# Patient Record
Sex: MALE | Race: WHITE | NOT HISPANIC OR LATINO | Employment: OTHER | URBAN - METROPOLITAN AREA
[De-identification: names, ages, dates, MRNs, and addresses within clinical notes are randomized per-mention and may not be internally consistent; named-entity substitution may affect disease eponyms.]

---

## 2019-04-10 ENCOUNTER — APPOINTMENT (EMERGENCY)
Dept: RADIOLOGY | Facility: HOSPITAL | Age: 69
End: 2019-04-10
Payer: MEDICARE

## 2019-04-10 ENCOUNTER — HOSPITAL ENCOUNTER (EMERGENCY)
Facility: HOSPITAL | Age: 69
Discharge: HOME | End: 2019-04-10
Attending: EMERGENCY MEDICINE | Admitting: EMERGENCY MEDICINE
Payer: MEDICARE

## 2019-04-10 VITALS
HEIGHT: 71 IN | WEIGHT: 185 LBS | BODY MASS INDEX: 25.9 KG/M2 | RESPIRATION RATE: 16 BRPM | SYSTOLIC BLOOD PRESSURE: 141 MMHG | TEMPERATURE: 97.9 F | DIASTOLIC BLOOD PRESSURE: 67 MMHG | OXYGEN SATURATION: 96 % | HEART RATE: 51 BPM

## 2019-04-10 DIAGNOSIS — M54.9 BACK PAIN: Primary | ICD-10-CM

## 2019-04-10 LAB
ALBUMIN SERPL BCP-MCNC: 4.3 G/DL (ref 3.5–5.7)
ALP SERPL-CCNC: 71 U/L (ref 55–165)
ALT SERPL W P-5'-P-CCNC: 22 U/L (ref 7–52)
ANION GAP SERPL CALCULATED.3IONS-SCNC: 5 MMOL/L (ref 4–13)
AST SERPL W P-5'-P-CCNC: 19 U/L (ref 13–39)
BACTERIA UR QL AUTO: ABNORMAL /HPF
BASOPHILS # BLD AUTO: 0.1 THOUSANDS/ΜL (ref 0–0.1)
BASOPHILS NFR BLD AUTO: 1 % (ref 0–2)
BILIRUB SERPL-MCNC: 0.3 MG/DL (ref 0.2–1)
BILIRUB UR QL STRIP: NEGATIVE
BUN SERPL-MCNC: 25 MG/DL (ref 7–25)
CALCIUM SERPL-MCNC: 9 MG/DL (ref 8.6–10.5)
CHLORIDE SERPL-SCNC: 107 MMOL/L (ref 98–107)
CLARITY UR: CLEAR
CO2 SERPL-SCNC: 26 MMOL/L (ref 21–31)
COLOR UR: YELLOW
CREAT SERPL-MCNC: 1.17 MG/DL (ref 0.7–1.3)
EOSINOPHIL # BLD AUTO: 0.3 THOUSAND/ΜL (ref 0–0.61)
EOSINOPHIL NFR BLD AUTO: 3 % (ref 0–5)
ERYTHROCYTE [DISTWIDTH] IN BLOOD BY AUTOMATED COUNT: 14 % (ref 11.5–14.5)
GFR SERPL CREATININE-BSD FRML MDRD: 63 ML/MIN/1.73SQ M
GLUCOSE SERPL-MCNC: 117 MG/DL (ref 65–99)
GLUCOSE UR STRIP-MCNC: NEGATIVE MG/DL
HCT VFR BLD AUTO: 36.6 % (ref 42–47)
HGB BLD-MCNC: 12.9 G/DL (ref 14–18)
HGB UR QL STRIP.AUTO: ABNORMAL
KETONES UR STRIP-MCNC: NEGATIVE MG/DL
LEUKOCYTE ESTERASE UR QL STRIP: NEGATIVE
LYMPHOCYTES # BLD AUTO: 2.6 THOUSANDS/ΜL (ref 0.6–4.47)
LYMPHOCYTES NFR BLD AUTO: 35 % (ref 21–51)
MCH RBC QN AUTO: 33.1 PG (ref 26–34)
MCHC RBC AUTO-ENTMCNC: 35.4 G/DL (ref 31–37)
MCV RBC AUTO: 94 FL (ref 81–99)
MONOCYTES # BLD AUTO: 0.9 THOUSAND/ΜL (ref 0.17–1.22)
MONOCYTES NFR BLD AUTO: 12 % (ref 2–12)
NEUTROPHILS # BLD AUTO: 3.8 THOUSANDS/ΜL (ref 1.4–6.5)
NEUTS SEG NFR BLD AUTO: 49 % (ref 42–75)
NITRITE UR QL STRIP: NEGATIVE
NON-SQ EPI CELLS URNS QL MICRO: ABNORMAL /HPF
PH UR STRIP.AUTO: 6 [PH]
PLATELET # BLD AUTO: 235 THOUSANDS/UL (ref 149–390)
PMV BLD AUTO: 7.4 FL (ref 8.6–11.7)
POTASSIUM SERPL-SCNC: 4.1 MMOL/L (ref 3.5–5.5)
PROT SERPL-MCNC: 6.9 G/DL (ref 6.4–8.9)
PROT UR STRIP-MCNC: NEGATIVE MG/DL
RBC # BLD AUTO: 3.91 MILLION/UL (ref 4.3–5.9)
RBC #/AREA URNS AUTO: ABNORMAL /HPF
SODIUM SERPL-SCNC: 138 MMOL/L (ref 134–143)
SP GR UR STRIP.AUTO: 1.02 (ref 1–1.03)
TROPONIN I SERPL-MCNC: <0.03 NG/ML
UROBILINOGEN UR QL STRIP.AUTO: 0.2 E.U./DL
WBC # BLD AUTO: 7.6 THOUSAND/UL (ref 4.8–10.8)
WBC #/AREA URNS AUTO: ABNORMAL /HPF

## 2019-04-10 PROCEDURE — 85025 COMPLETE CBC W/AUTO DIFF WBC: CPT | Performed by: EMERGENCY MEDICINE

## 2019-04-10 PROCEDURE — 84484 ASSAY OF TROPONIN QUANT: CPT | Performed by: EMERGENCY MEDICINE

## 2019-04-10 PROCEDURE — 80053 COMPREHEN METABOLIC PANEL: CPT | Performed by: EMERGENCY MEDICINE

## 2019-04-10 PROCEDURE — 99284 EMERGENCY DEPT VISIT MOD MDM: CPT

## 2019-04-10 PROCEDURE — 71046 X-RAY EXAM CHEST 2 VIEWS: CPT

## 2019-04-10 PROCEDURE — 93005 ELECTROCARDIOGRAM TRACING: CPT

## 2019-04-10 PROCEDURE — 74018 RADEX ABDOMEN 1 VIEW: CPT

## 2019-04-10 PROCEDURE — 36415 COLL VENOUS BLD VENIPUNCTURE: CPT | Performed by: EMERGENCY MEDICINE

## 2019-04-10 PROCEDURE — 81001 URINALYSIS AUTO W/SCOPE: CPT | Performed by: EMERGENCY MEDICINE

## 2019-04-10 RX ORDER — CLONIDINE HYDROCHLORIDE 0.1 MG/1
0.2 TABLET ORAL ONCE
Status: COMPLETED | OUTPATIENT
Start: 2019-04-10 | End: 2019-04-10

## 2019-04-10 RX ORDER — ASPIRIN 81 MG/1
81 TABLET ORAL DAILY
COMMUNITY

## 2019-04-10 RX ADMIN — CLONIDINE HYDROCHLORIDE 0.2 MG: 0.1 TABLET ORAL at 20:20

## 2019-04-11 LAB
ATRIAL RATE: 57 BPM
P AXIS: 67 DEGREES
PR INTERVAL: 188 MS
QRS AXIS: 15 DEGREES
QRSD INTERVAL: 104 MS
QT INTERVAL: 436 MS
QTC INTERVAL: 424 MS
T WAVE AXIS: 37 DEGREES
VENTRICULAR RATE: 57 BPM

## 2019-04-11 PROCEDURE — 93010 ELECTROCARDIOGRAM REPORT: CPT | Performed by: INTERNAL MEDICINE

## 2021-12-09 ENCOUNTER — TELEPHONE (OUTPATIENT)
Dept: SURGERY | Facility: CLINIC | Age: 71
End: 2021-12-09

## 2021-12-16 ENCOUNTER — TELEPHONE (OUTPATIENT)
Dept: SURGERY | Facility: CLINIC | Age: 71
End: 2021-12-16

## 2022-01-27 ENCOUNTER — APPOINTMENT (OUTPATIENT)
Dept: LAB | Facility: CLINIC | Age: 72
End: 2022-01-27
Payer: COMMERCIAL

## 2022-01-27 DIAGNOSIS — Z01.818 OTHER SPECIFIED PRE-OPERATIVE EXAMINATION: ICD-10-CM

## 2022-01-27 LAB
ALBUMIN SERPL BCP-MCNC: 3.9 G/DL (ref 3.5–5)
ALP SERPL-CCNC: 103 U/L (ref 46–116)
ALT SERPL W P-5'-P-CCNC: 33 U/L (ref 12–78)
ANION GAP SERPL CALCULATED.3IONS-SCNC: 2 MMOL/L (ref 4–13)
AST SERPL W P-5'-P-CCNC: 16 U/L (ref 5–45)
BASOPHILS # BLD AUTO: 0.06 THOUSANDS/ΜL (ref 0–0.1)
BASOPHILS NFR BLD AUTO: 1 % (ref 0–1)
BILIRUB SERPL-MCNC: 0.36 MG/DL (ref 0.2–1)
BUN SERPL-MCNC: 29 MG/DL (ref 5–25)
CALCIUM SERPL-MCNC: 9.7 MG/DL (ref 8.3–10.1)
CHLORIDE SERPL-SCNC: 107 MMOL/L (ref 100–108)
CO2 SERPL-SCNC: 28 MMOL/L (ref 21–32)
CREAT SERPL-MCNC: 1.18 MG/DL (ref 0.6–1.3)
EOSINOPHIL # BLD AUTO: 0.16 THOUSAND/ΜL (ref 0–0.61)
EOSINOPHIL NFR BLD AUTO: 2 % (ref 0–6)
ERYTHROCYTE [DISTWIDTH] IN BLOOD BY AUTOMATED COUNT: 13.8 % (ref 11.6–15.1)
GFR SERPL CREATININE-BSD FRML MDRD: 61 ML/MIN/1.73SQ M
GLUCOSE P FAST SERPL-MCNC: 146 MG/DL (ref 65–99)
HCT VFR BLD AUTO: 38.4 % (ref 36.5–49.3)
HGB BLD-MCNC: 12.9 G/DL (ref 12–17)
IMM GRANULOCYTES # BLD AUTO: 0.02 THOUSAND/UL (ref 0–0.2)
IMM GRANULOCYTES NFR BLD AUTO: 0 % (ref 0–2)
LYMPHOCYTES # BLD AUTO: 2.21 THOUSANDS/ΜL (ref 0.6–4.47)
LYMPHOCYTES NFR BLD AUTO: 31 % (ref 14–44)
MCH RBC QN AUTO: 32 PG (ref 26.8–34.3)
MCHC RBC AUTO-ENTMCNC: 33.6 G/DL (ref 31.4–37.4)
MCV RBC AUTO: 95 FL (ref 82–98)
MONOCYTES # BLD AUTO: 0.72 THOUSAND/ΜL (ref 0.17–1.22)
MONOCYTES NFR BLD AUTO: 10 % (ref 4–12)
NEUTROPHILS # BLD AUTO: 3.96 THOUSANDS/ΜL (ref 1.85–7.62)
NEUTS SEG NFR BLD AUTO: 56 % (ref 43–75)
NRBC BLD AUTO-RTO: 0 /100 WBCS
PLATELET # BLD AUTO: 232 THOUSANDS/UL (ref 149–390)
PMV BLD AUTO: 10.3 FL (ref 8.9–12.7)
POTASSIUM SERPL-SCNC: 4.1 MMOL/L (ref 3.5–5.3)
PROT SERPL-MCNC: 8 G/DL (ref 6.4–8.2)
RBC # BLD AUTO: 4.03 MILLION/UL (ref 3.88–5.62)
SODIUM SERPL-SCNC: 137 MMOL/L (ref 136–145)
WBC # BLD AUTO: 7.13 THOUSAND/UL (ref 4.31–10.16)

## 2022-01-27 PROCEDURE — 80053 COMPREHEN METABOLIC PANEL: CPT

## 2022-01-27 PROCEDURE — 36415 COLL VENOUS BLD VENIPUNCTURE: CPT

## 2022-01-27 PROCEDURE — 85025 COMPLETE CBC W/AUTO DIFF WBC: CPT

## 2022-02-07 ENCOUNTER — OFFICE VISIT (OUTPATIENT)
Dept: FAMILY MEDICINE CLINIC | Facility: CLINIC | Age: 72
End: 2022-02-07
Payer: COMMERCIAL

## 2022-02-07 VITALS
HEART RATE: 71 BPM | DIASTOLIC BLOOD PRESSURE: 70 MMHG | HEIGHT: 71 IN | BODY MASS INDEX: 24.67 KG/M2 | WEIGHT: 176.2 LBS | OXYGEN SATURATION: 92 % | SYSTOLIC BLOOD PRESSURE: 148 MMHG | TEMPERATURE: 96 F

## 2022-02-07 DIAGNOSIS — Z76.89 ENCOUNTER TO ESTABLISH CARE WITH NEW DOCTOR: Primary | ICD-10-CM

## 2022-02-07 DIAGNOSIS — F17.200 CURRENT EVERY DAY SMOKER: ICD-10-CM

## 2022-02-07 DIAGNOSIS — I70.0 ABDOMINAL AORTIC ATHEROSCLEROSIS (HCC): ICD-10-CM

## 2022-02-07 DIAGNOSIS — J43.9 PULMONARY EMPHYSEMA, UNSPECIFIED EMPHYSEMA TYPE (HCC): ICD-10-CM

## 2022-02-07 DIAGNOSIS — R73.01 ELEVATED FASTING GLUCOSE: ICD-10-CM

## 2022-02-07 PROBLEM — Z87.820 HISTORY OF BRAIN CONCUSSION: Status: ACTIVE | Noted: 2022-02-07

## 2022-02-07 PROBLEM — D35.02 ADENOMA OF LEFT ADRENAL GLAND: Status: ACTIVE | Noted: 2022-02-07

## 2022-02-07 PROCEDURE — 1160F RVW MEDS BY RX/DR IN RCRD: CPT | Performed by: FAMILY MEDICINE

## 2022-02-07 PROCEDURE — 99203 OFFICE O/P NEW LOW 30 MIN: CPT | Performed by: FAMILY MEDICINE

## 2022-02-07 PROCEDURE — 3725F SCREEN DEPRESSION PERFORMED: CPT | Performed by: FAMILY MEDICINE

## 2022-02-07 PROCEDURE — 3288F FALL RISK ASSESSMENT DOCD: CPT | Performed by: FAMILY MEDICINE

## 2022-02-07 PROCEDURE — 1101F PT FALLS ASSESS-DOCD LE1/YR: CPT | Performed by: FAMILY MEDICINE

## 2022-02-07 RX ORDER — MIRTAZAPINE 15 MG/1
15 TABLET, FILM COATED ORAL DAILY
COMMUNITY

## 2022-02-07 NOTE — PROGRESS NOTES
PRE-OPERATIVE EVALUATION  FAMILY PRACTICE AT [id number]    NAME: Paul Corrales  AGE: 66 y o  SEX: male  : 1950     DATE: 2022    Internal Medicine Pre-Operative Evaluation      Chief Complaint: Pre-operative Evaluation     Surgery: hernia repair  Anticipated Date of Surgery: 02/10/2022  Referring Provider: Dr Francisco Gutierrez      History of Present Illness:     Paul Corrales is a 66 y o  male who presents to the office today for visit to establish care and stating that he needs a preoperative consultation  Planned anesthesia is general  Patient has a bleeding risk of: no recent abnormal bleeding, no remote history of abnormal bleeding and no use of Ca-channel blockers  Patient does not have objections to receiving blood products if needed  Current anti-platelet/anti-coagulation medications that the patient is prescribed includes: Aspirin        Assessment of Chronic Conditions:         Pulmonary emphysema, unspecified emphysema type (HCC)      asymptomatic, stable      Current every day smoker      we discussed smoking cessation      Abdominal aortic atherosclerosis (HCC)      clinically stable- decrease and control risk factors      Elevated fasting glucose      prediabetes- pt will adjust diet, cut back on sugars/carbohydrates        Assessment of Cardiac Risk:  · Denies unstable or severe angina or MI in the last 6 weeks or history of stent placement in the last year   · Denies decompensated heart failure (e g  New onset heart failure, NYHA functional class IV heart failure, or worsening existing heart failure)  · Denies significant arrhythmias such as high grade AV block, symptomatic ventricular arrhythmia, newly recognized ventricular tachycardia, supraventricular tachycardia with resting heart rate >100, or symptomatic bradycardia  · Denies severe heart valve disease including aortic stenosis or symptomatic mitral stenosis     Exercise Capacity:  · Able to walk 4 blocks without symptoms?: yes  · Able to walk 2 flights without symptoms?: yes    Prior Anesthesia Reactions: No     Personal history of venous thromboembolic disease? No    History of steroid use for >2 weeks within last year? No    STOP-BANG Sleep Apnea Screening Questionnaire:  no witnessed apnea events       Review of Systems:     Review of Systems    Current Problem List:     Patient Active Problem List   Diagnosis    Elevated fasting glucose    Pulmonary emphysema (HCC)    Current every day smoker    History of brain concussion    Abdominal aortic atherosclerosis (HCC)    Adenoma of left adrenal gland       Allergies:     No Known Allergies    Physical Exam:     Vitals:    02/07/22 1202   BP: 148/70   BP Location: Left arm   Patient Position: Sitting   Cuff Size: Standard   Pulse: 71   Temp: (!) 96 °F (35 6 °C)   TempSrc: Tympanic   SpO2: 92%   Weight: 79 9 kg (176 lb 3 2 oz)   Height: 5' 11" (1 803 m)           Current Outpatient Medications:     albuterol (PROVENTIL HFA,VENTOLIN HFA) 90 mcg/act inhaler, Inhale 2 puffs every 6 (six) hours as needed, Disp: , Rfl:     aspirin (ECOTRIN LOW STRENGTH) 81 mg EC tablet, Take 81 mg by mouth daily, Disp: , Rfl:     atorvastatin (LIPITOR) 40 mg tablet, Take 40 mg by mouth daily, Disp: , Rfl:     esomeprazole (NexIUM) 20 mg capsule, Take 20 mg by mouth in the morning, Disp: , Rfl:     lisinopril (ZESTRIL) 20 mg tablet, Take 20 mg by mouth daily, Disp: , Rfl:     LORazepam (ATIVAN) 1 mg tablet, Take by mouth, Disp: , Rfl:     melatonin 3 mg, Take 6 mg by mouth, Disp: , Rfl:     mirtazapine (REMERON) 15 mg tablet, Take 15 mg by mouth in the morning, Disp: , Rfl:     sertraline (ZOLOFT) 100 mg tablet, Take 150 mg by mouth daily, Disp: , Rfl:     Past Medical History:       Past Medical History:   Diagnosis Date    [facility]m [facility] )     [facility]     History of stomach ulcers     Hypertension     Leaky heart valve     Renal disorder         History reviewed   No pertinent surgical history  History reviewed  No pertinent family history  Social History     Socioeconomic History    Marital status: /Civil Union     Spouse name: Not on file    Number of children: Not on file    Years of education: Not on file    Highest education level: Not on file   Occupational History    Not on file   Tobacco Use    Smoking status: Current Every Day Smoker     Packs/day: 0 25    Smokeless tobacco: Never Used   Vaping Use    Vaping Use: Never used   Substance and Sexual Activity    Alcohol use: Never    Drug use: Never    Sexual activity: Not on file   Other Topics Concern    Not on file   Social History Narrative    [employer] [address]         Social Determinants of Health     Financial Resource Strain: Not on file   Food Insecurity: Not on file   Transportation Needs: Not on file   Physical Activity: Not on file   Stress: Not on file   Social Connections: Not on file   Intimate Partner Violence: Not on file   Housing Stability: Not on file        Physical Exam:     Physical Exam     Data:     Pre-operative work-up    Laboratory Results: I have personally reviewed the pertinent laboratory results/reports     EKG: I have personally reviewed pertinent reports  and saw cardiol 01/18/2022    Chest x-ray: 2019 normal    Previous cardiopulmonary studies within the past year:  · Echocardiogram: none  · Cardiac Catheterization: none  · Stress Test: none  · Pulmonary Function Testing: none      Assessment & Recommendations:     1  Encounter to establish care with new doctor     2  Pulmonary emphysema, unspecified emphysema type ([address] )      asymptomatic, stable   3  Current every day smoker      we discussed smoking cessation   4  Abdominal aortic atherosclerosis (HCC)      clinically stable- decrease and control risk factors   5   Elevated fasting glucose      prediabetes- pt will adjust diet, cut back on sugars/carbohydrates       Pre-Op Evaluation Assessment  66 y o  male with planned surgery: hernia repair  Known risk factors for perioperative complications: Chronic pulmonary disease  Cardiac Risk Estimation: per the Revised Cardiac Risk Index (Circ  100:1043, 1999), the patient's risk factors for cardiac complications include none, putting him in: RCI RISK CLASS I (0 risk factors, risk of major cardiac compl  appr  0 5%)  Current medications which may produce withdrawal symptoms if withheld perioperatively: zoloft  Pre-Op Evaluation Plan  1  Further preoperative workup as follows:   - None; no further preoperative work-up is required    2  Medication Management/Recommendations:   - Patient has been instructed to avoid aspirin containing medications or non-steroidal anti-inflammatory drugs for the week preceding surgery  3  Prophylaxis for cardiac events with perioperative beta-blockers: should be considered, specific regimen per anesthesia  4  Patient requires further consultation with: None    Clearance  Patient is CLEARED for surgery without any additional cardiac testing       Kaleb Varma DO  FAMILY PRACTICE AT [facility]  [address] [facility] [address]  Phone#  [phone]  Fax#  [phone]

## 2022-02-07 NOTE — PROGRESS NOTES
Assessment/Plan:         Diagnoses and all orders for this visit:    Encounter to establish care with new doctor    Pulmonary emphysema, unspecified emphysema type ([facility] )  Comments:  asymptomatic, stable    Current every day smoker  Comments:  we discussed smoking cessation    Abdominal aortic atherosclerosis ([facility] )  Comments:  clinically stable- decrease and control risk factors    Elevated fasting glucose  Comments:  prediabetes- pt will adjust diet, cut back on sugars/carbohydrates    Other orders  -     esomeprazole (NexIUM) 20 mg capsule; Take 20 mg by mouth in the morning  -     mirtazapine (REMERON) 15 mg tablet; Take 15 mg by mouth in the morning          Subjective:   Chief Complaint   Patient presents with   [address]     Does not want flu vaccine,         Patient ID: Polina Bryan is a 66 y o  male  Here as new pt- previous PCP was Dr Adry Carranza for more than 20 yrs per pt- doesn't take his new insurance- he reports he is scheduled for hernia repair in 3 days and needs preop clearance  Chart review by me shows that he saw [facility] cardiol 01/19/2022 as new pt for cardiac clearance and was cleared  Psych meds on med list - pt states rx'd by [facility]          1/19/2022  [facility]  Progress Notes  Luis E Cool MD - 01/19/2022 9:45 AM EST  Formatting of this note is different from the original   Polina Bryan is a 66 y o  male who was seen by [address] [place] [address] on 1/19/2022 for evaluation of preop risk  past medical [facility] of [facility] ), H pylori ulcer, Hypertension, and PTSD (post-traumatic stress disorder)  Problem List Items Addressed LAST Visit   Lab work at [address] [facility] last month  ? Low hgb  +HTN of lisinopril  Forgot meds and rushed, BP high  No hx DM, MI, CAD/ACS, CVA  Cardiac review of systems is negative for symptoms suggestive of angina, congestive heart failure, arrhythmia, TIA or stroke and claudication  No edema  Good functional capacity   Able to walk an unlimited distance  No restriction on normal activity  Some pain with hernia  Smokes less than 1 ppd  Below are reports reviewed this visit:   Date: 2/10/2022 Time:  2:00 PM Status: Scheduled   Location: [facility] OR Room: [facility] Service: General   Patient class: Hospital Outpatient Surgery Case classification: Elective   Diagnosis Information  Diagnosis   Bilateral inguinal hernia without obstruction or gangrene, recurrence not specified   Panel Information  Panel 1  Surgeon Role   Conny Holter, MD Primary   Procedure Laterality Anesthesia   XI ROBOT ASSISTED LAPAROSCOPIC REPAIR BILATERAL INGUINAL HERNIAS WITH MESH, POSSIBLE OPEN (64167 (CPT®))   Past Medical History:   Diagnosis Date    Emphysema lung ([address] )    H pylori ulcer    Hypertension    PTSD (post-traumatic stress disorder)    Problem List Items Addressed This Visit   Cardiovascular and Mediastinum   Primary hypertension better on repeat BP   Other   Non-recurrent bilateral inguinal hernia without obstruction or gangrene for surgery   Pre-op evaluation no patient predictors of increased cardiac risk  Recommend proceed  Lipid disorder, cont statin, I do not have recent lipids to review  Encounter to establish care - Primary   Relevant Orders   ECG 12-LEAD (Completed)   Patient Instructions:  I would proceed with surgery  Please quit smoking  Please continue to social distance during COVID 19 pandemic  Please be active but stay at least 6 feet away from other people in public  Please wear a face mask when in public in doors  Please call PCP if you develop cough, fever, shortness of breath and/or body aches    Electronically signed by Atul Stearns MD at 01/21/2022 2:49 PM EST              The following portions of the patient's history were reviewed and updated as appropriate: allergies, current medications, past family history, past medical history, past social history, past surgical history and problem list     Review of Systems   HENT: Positive for tinnitus (since this past summer was nearby someone hitting steel trying to break)  Musculoskeletal: Positive for arthralgias (c/o arthritis)  Objective:      /70 (BP Location: Left arm, Patient Position: Sitting, Cuff Size: Standard)   Pulse 71   Temp (!) 96 °F (35 6 °C) (Tympanic)   Ht 5' 11" (1 803 m)   Wt 79 9 kg (176 lb 3 2 oz)   SpO2 92%   BMI 24 57 kg/m²          Physical Exam  Vitals and nursing note reviewed  Constitutional:       General: He is not in acute distress  Appearance: He is well-developed  He is not ill-appearing, toxic-appearing or diaphoretic  HENT:      Head: Normocephalic and atraumatic  Right Ear: Tympanic membrane, ear canal and external ear normal       Left Ear: Tympanic membrane, ear canal and external ear normal       Ears:      Comments: Scant dry wax right canal     Nose: Nose normal       Mouth/Throat:      Mouth: Mucous membranes are moist       Pharynx: Oropharynx is clear  Uvula midline  Eyes:      General: Lids are normal       Extraocular Movements: Extraocular movements intact  Conjunctiva/sclera: Conjunctivae normal       Pupils: Pupils are equal, round, and reactive to light  Neck:      Thyroid: No thyroid mass or thyromegaly  Vascular: No JVD  Trachea: Trachea normal    Cardiovascular:      Rate and Rhythm: Normal rate and regular rhythm  Pulses: Normal pulses  Heart sounds: Normal heart sounds  Pulmonary:      Effort: Pulmonary effort is normal       Breath sounds: Normal breath sounds  Chest:   Breasts:      Right: No supraclavicular adenopathy  Left: No supraclavicular adenopathy  Abdominal:      General: Bowel sounds are normal  There is no distension or abdominal bruit  Palpations: Abdomen is soft  There is no hepatomegaly, splenomegaly or mass  Tenderness: There is no abdominal tenderness  Hernia: There is no hernia in the ventral area   Left inguinal: not examined  Right inguinal: not examined  Musculoskeletal:      Cervical back: Neck supple  Right lower leg: No edema  Left lower leg: No edema  Lymphadenopathy:      Cervical: No cervical adenopathy  Upper Body:      Right upper body: No supraclavicular adenopathy  Left upper body: No supraclavicular adenopathy  Skin:     General: Skin is warm and dry  Coloration: Skin is not pale  Neurological:      Mental Status: He is alert and oriented to person, place, and time  Gait: Gait normal    Psychiatric:         Mood and Affect: Mood normal          Behavior: Behavior normal  Behavior is cooperative

## 2022-02-18 ENCOUNTER — TRANSITIONAL CARE MANAGEMENT (OUTPATIENT)
Dept: FAMILY MEDICINE CLINIC | Facility: CLINIC | Age: 72
End: 2022-02-18

## 2022-02-23 ENCOUNTER — OFFICE VISIT (OUTPATIENT)
Dept: FAMILY MEDICINE CLINIC | Facility: CLINIC | Age: 72
End: 2022-02-23
Payer: COMMERCIAL

## 2022-02-23 ENCOUNTER — TELEPHONE (OUTPATIENT)
Dept: FAMILY MEDICINE CLINIC | Facility: CLINIC | Age: 72
End: 2022-02-23

## 2022-02-23 VITALS
SYSTOLIC BLOOD PRESSURE: 98 MMHG | WEIGHT: 174 LBS | HEART RATE: 60 BPM | BODY MASS INDEX: 24.36 KG/M2 | HEIGHT: 71 IN | DIASTOLIC BLOOD PRESSURE: 58 MMHG | OXYGEN SATURATION: 96 % | TEMPERATURE: 98.2 F

## 2022-02-23 DIAGNOSIS — F17.200 CURRENT EVERY DAY SMOKER: ICD-10-CM

## 2022-02-23 DIAGNOSIS — I50.41 ACUTE COMBINED SYSTOLIC (CONGESTIVE) AND DIASTOLIC (CONGESTIVE) HEART FAILURE (HCC): ICD-10-CM

## 2022-02-23 DIAGNOSIS — Z76.89 ENCOUNTER FOR SUPPORT AND COORDINATION OF TRANSITION OF CARE: Primary | ICD-10-CM

## 2022-02-23 DIAGNOSIS — Z95.820 S/P ANGIOPLASTY WITH STENT: ICD-10-CM

## 2022-02-23 DIAGNOSIS — I47.1 SVT (SUPRAVENTRICULAR TACHYCARDIA) (HCC): ICD-10-CM

## 2022-02-23 DIAGNOSIS — Z09 HOSPITAL DISCHARGE FOLLOW-UP: ICD-10-CM

## 2022-02-23 DIAGNOSIS — I95.9 SYMPTOMATIC HYPOTENSION: ICD-10-CM

## 2022-02-23 DIAGNOSIS — Z98.890 S/P BILATERAL INGUINAL HERNIA REPAIR: ICD-10-CM

## 2022-02-23 DIAGNOSIS — Z87.19 S/P BILATERAL INGUINAL HERNIA REPAIR: ICD-10-CM

## 2022-02-23 DIAGNOSIS — I21.3 ST ELEVATION MYOCARDIAL INFARCTION (STEMI), UNSPECIFIED ARTERY (HCC): ICD-10-CM

## 2022-02-23 PROBLEM — Z12.11 COLON CANCER SCREENING: Status: ACTIVE | Noted: 2022-02-23

## 2022-02-23 PROBLEM — I47.10 SVT (SUPRAVENTRICULAR TACHYCARDIA): Status: ACTIVE | Noted: 2022-02-23

## 2022-02-23 PROCEDURE — 99495 TRANSJ CARE MGMT MOD F2F 14D: CPT | Performed by: FAMILY MEDICINE

## 2022-02-23 PROCEDURE — 3008F BODY MASS INDEX DOCD: CPT | Performed by: FAMILY MEDICINE

## 2022-02-23 RX ORDER — METOPROLOL SUCCINATE 25 MG/1
25 TABLET, EXTENDED RELEASE ORAL DAILY
COMMUNITY
Start: 2022-02-13

## 2022-02-23 RX ORDER — ACETAMINOPHEN 500 MG
500 TABLET ORAL EVERY 4 HOURS PRN
COMMUNITY
Start: 2022-02-13 | End: 2022-02-23

## 2022-02-23 RX ORDER — ROSUVASTATIN CALCIUM 40 MG/1
40 TABLET, COATED ORAL EVERY EVENING
COMMUNITY
Start: 2022-02-13

## 2022-02-23 RX ORDER — NITROGLYCERIN 0.4 MG/1
TABLET SUBLINGUAL
COMMUNITY
Start: 2022-02-13

## 2022-02-23 RX ORDER — VALSARTAN 80 MG/1
80 TABLET ORAL DAILY
COMMUNITY
Start: 2022-02-14

## 2022-02-23 RX ORDER — PANTOPRAZOLE SODIUM 20 MG/1
20 TABLET, DELAYED RELEASE ORAL EVERY MORNING
COMMUNITY
Start: 2022-02-13

## 2022-02-23 RX ORDER — SPIRONOLACTONE 25 MG/1
12.5 TABLET ORAL DAILY
COMMUNITY
Start: 2022-02-13

## 2022-02-23 RX ORDER — CLOPIDOGREL BISULFATE 75 MG/1
75 TABLET ORAL DAILY
COMMUNITY
Start: 2022-02-13

## 2022-02-23 NOTE — PROGRESS NOTES
Assessment/Plan:          Diagnoses and all orders for this visit:    Encounter for support and coordination of transition of care    Hospital discharge follow-up    Symptomatic hypotension  Comments:  our staff contacted pt's cardiol to see best approach - cut losartan in half or hold aldactone- cardiol office will contact pt directly with instructions; I advised pt to take it easy today, make sure continuing adequate fluid intake, and go to ER if sx worsen    ST elevation myocardial infarction (STEMI), unspecified artery ([facility] )  Comments:  pt has f/u of hospitalization already scheduled with his cardiologist next week; has been doing well since d/c except as above    S/P angioplasty with stent  Comments:  doing very well since d/c except today's symptomatic hypotension as above; access site right wrist fully healed    Acute combined systolic (congestive) and diastolic (congestive) heart failure ([facility] )  Comments:  responded to diuresis as well as revascularization of the LAD by cath/stent placed and HF resolved in hospital    Current every day smoker  Comments:  advised again to stop    SVT (supraventricular tachycardia) ([facility] )  Comments:  nonsustained while in hospital- resolved/treated with initiation of beta-blocker     S/P bilateral inguinal hernia repair  Comments:  pt suffered an acute anterior wall ST elevation myocardial infarction when he awoke from anesthesia following bilateral inguinal hernia repair  had virtual visit follow up with surgeon 02/18 and released to prn care; abd surgical wounds look great today, healing very well           Subjective:  Chief Complaint   Patient presents with    Transition of Care Management     D/C 2/13/2022 [facility] Declined flu shot  No refills needed today  Patient ID: Mahesh Vidal is a 66 y o  male      Per c/c, NUBIA and hosp notes reviewed by me  Had Acute STEMI in PACU from bilateral hernia repair 2/10  Was new pt here at few weeks ago- per note 2/7/2022 [facility]: <<"Here as new pt- previous PCP was Dr Keiko Robledo for more than 20 yrs per pt- doesn't take his new insurance- he reports he is scheduled for hernia repair in 3 days and needs preop clearance; Chart review by me shows that he saw [facility] cardiol 01/19/2022 as new pt for cardiac clearance and was cleared">>  States had been doing well since d/c until today "feeling a little dizzy, whoozy," and bp is low here today- states he does have bp machine at home- last checked last night - was 112-118/60 per pt and had no symptoms until today  Has f/u with his cardiologist next week  Did not have any VNA services  Took all his meds this morning  No CP's or SOB, no N/V or abd pain      DISCHARGE SUMMARY  Admitting Provider: Gage Jefferson MD  Discharging Provider: Gage Jefferson MD  Primary Care Physician at Discharge: Sheron Kaurarnold 21   Admission Date: 2/10/2022   Discharge Date: 2/13/22  Discharged From: [facility]   Admission Diagnosis  STEMI (ST elevation myocardial infarction) ([facility] ) (I21 3)  Discharge Diagnosis  Active Hospital Problems   Diagnosis Date Noted    Acute combined systolic (congestive) and diastolic (congestive) heart failure ([facility] ) 02/11/2022    STEMI (ST elevation myocardial infarction) ([facility] ) 02/10/2022    Abdominal aortic atherosclerosis ([facility] ) 02/07/2022    Current every day smoker 02/07/2022    Pulmonary emphysema ([facility] ) 02/07/2022    Elevated fasting glucose 02/07/2022    Lipid disorder 01/19/2022    Primary hypertension 01/19/2022    Non-recurrent bilateral inguinal hernia without obstruction or gangrene 12/28/2021   Resolved Hospital Problems   No resolved problems to display  Procedures   [facility] 2/10/22:  · Patient meets criteria for NYHA class IV  · There is mild left ventricular systolic dysfunction  · The ejection fraction is 40-50% by visual estimate    The patient developed an acute anterior wall ST elevation myocardial infarction when he awoke from anesthesia following bilateral inguinal hernia repair  Upon arrival in the cath lab he had ongoing chest pain with persistent ST elevations on the monitor and had evidence of acute combined congestive heart failure  Findings:  1  Moderate elevation of left ventricular end diastolic pressure  2  Moderate discrete distal left main stenosis  3  Total occlusion of the ostial and proximal left anterior descending coronary artery as the culprit for the acute STEMI  4  Multiple moderate to severe stenoses of the proximal, mid, and distal right coronary artery  5  Overall mildly reduced left ventricular ejection fraction of 45% with anterolateral segmental hypokinesis, apical dyskinesis, and hyperkinesis of the posterobasal segment  6  Successful PCI of the ostial and proximal LAD resulting in 0% residual stenosis and restoration of ERIC III flow in the left anterior descending coronary artery  Operative Procedures Performed  Procedure(s):  Coronary angiography  Left heart cath  Ventriculography  Angioplasty - coronary  Stent - coronary  Pertinent Test Results  Echo 2/10/22:   Left Ventricle: Left ventricle is normal in size  Definity contrast utilized  No evidence of LV thrombus  Wall thickness is normal  Systolic function is mildly decreased with an ejection fraction of 45-50%  LAD wall motion abnormality  Diastolic dysfunction with normal filling pressures   Left Atrium: Left atrium cavity size is normal    Right Ventricle: Right ventricle cavity is normal  Systolic function is normal    Right Atrium: Right atrium cavity is normal    Aortic Valve: The aortic valve was not well visualized  The aortic valve is trileaflet  There is no regurgitation or stenosis   Mitral Valve: Mitral valve structure is normal  There is trace regurgitation   Ascending Aorta: The aorta appears normal in size     Laurence Patterson is a 67 y/o male with a PMH of HTN, HLD, tobacco use (60 pack year), "aneurysm, and leaky valve" (patient and wife unsure which valve or where aneurysm is)  He presented with chest pressure following an elective inguinal hernia repair on 2/10  He was in the PACU following the procedure when he started to complain of chest pressure  EKG showed ST elevations in anterior leads  HS troponin 9 and hour 0  An MI alert was called and he was transferred to [facility] for LHC with a KERRIE placed to osital LAD - LHC also showed moderate CAD in the RCA and distal LAD  The anterior wall of the LV was noted to be hypokinetic and LVEDP was 30  He received 40 mg IV Lasix in the cath lab and was admitted to CICU post PCI for observation and further diuresis as needed  He did have some chest pain post-procedure and NSVT noted on telemetry  Visual estimate of EF was noted to be reduced  He was started on DAPT with ASA and Plavix, and also started on valsartan 80 mg daily and Toprol XL 25 mg daily, and spironolactone 12 5 mg daily  His home statin was transitioned to Crestor 40 mg daily  He was noted to have asymptomatic runs of NSVT on telemetry during admission which improved with initiation of BB  Discharge Disposition  home          Review of Systems   All other systems reviewed and are negative  Objective:     Physical Exam  Vitals and nursing note reviewed  Constitutional:       General: He is not in acute distress  Appearance: He is well-developed  He is not ill-appearing, toxic-appearing or diaphoretic  HENT:      Head: Normocephalic and atraumatic  Mouth/Throat:      Mouth: Mucous membranes are moist       Pharynx: Oropharynx is clear  Uvula midline  Eyes:      General: Lids are normal       Extraocular Movements: Extraocular movements intact  Conjunctiva/sclera: Conjunctivae normal       Pupils: Pupils are equal, round, and reactive to light  Neck:      Thyroid: No thyroid mass, thyromegaly or thyroid tenderness  Vascular: Normal carotid pulses   No carotid bruit, hepatojugular reflux or JVD  Trachea: Trachea and phonation normal    Cardiovascular:      Rate and Rhythm: Normal rate and regular rhythm  Pulses: Normal pulses  Heart sounds: S1 normal and S2 normal  No friction rub  No gallop  Pulmonary:      Effort: Pulmonary effort is normal       Breath sounds: Normal breath sounds  Chest:   Breasts:      Right: No supraclavicular adenopathy  Left: No supraclavicular adenopathy  Abdominal:      General: Bowel sounds are normal  There is no distension or abdominal bruit  Palpations: Abdomen is soft  There is no hepatomegaly, splenomegaly or mass  Tenderness: There is no abdominal tenderness  Comments: 3 small surgical wounds healing well, no drainage or erythema    Musculoskeletal:      Cervical back: Neck supple  Right lower leg: No edema  Left lower leg: No edema  Lymphadenopathy:      Cervical: No cervical adenopathy  Upper Body:      Right upper body: No supraclavicular adenopathy  Left upper body: No supraclavicular adenopathy  Skin:     General: Skin is warm and dry  Coloration: Skin is not pale  Comments: Right radial cath access site completely healed, no swelling or ecchymosis   Neurological:      Mental Status: He is alert and oriented to person, place, and time  Cranial Nerves: Cranial nerves are intact  Sensory: Sensation is intact  Motor: Motor function is intact  Coordination: Coordination normal       Gait: Gait normal    Psychiatric:         Mood and Affect: Mood normal          Behavior: Behavior normal  Behavior is cooperative             Vitals:    02/23/22 1342   BP: 98/58   BP Location: Left arm   Patient Position: Sitting   Cuff Size: Standard   Pulse: 60   Temp: 98 2 °F (36 8 °C)   TempSrc: Tympanic   SpO2: 96%   Weight: 78 9 kg (174 lb)   Height: 5' 11" (1 803 m)       Transitional Care Management Review:  Lucious Bernheim is a 66 y o  male here for TCM follow up  During the TCM phone call patient stated:    TCM Call (since 1/25/2022)     Date and time call was made  2/18/2022 10:36 AM    Hospital care reviewed  Records reviewed        [facility]ial[facility]        Date of Admission  02/10/22    Date of discharge  02/13/22    Diagnosis  myocardial infarction    Disposition  Home    Were the patients medications reviewed and updated  Yes    Current Symptoms  None      TCM Call (since 1/25/2022)     Post hospital issues  None    Should patient be enrolled in anticoag monitoring? Yes    Scheduled for follow up?   Yes    Did you obtain your prescribed medications  Yes    Do you need help managing your prescriptions or medications  No    Is transportation to your appointment needed  No    I have advised the patient to call PCP with any new or worsening symptoms  Nancy Velazquez MA    Living Arrangements  Spouse or Significiant other    Support System  Family    The type of support provided  Physical; Emotional    Do you have social support  Yes, as much as I need    Are you recieving any outpatient services  No    Are you recieving home care services  No    Are you using any community resources  No    Current waiver services  No    Have you fallen in the last 12 months  No    Interperter language line needed  No    Counseling  [address], DO

## 2022-02-23 NOTE — TELEPHONE ENCOUNTER
I called [facility] as a request per Dr Maria De Jesus Brock as patient is currently in our office and is symptomatic hypotensive  B/P 98/58 and patient not feeling well  Dr Maria De Jesus Brock suggested lowering his Valsartan from 80 mg to 40 mg or holding his Spironolactone  Office staff stated they will relay this message to Dr Jay Meza and call the patient directly with their recommendations

## 2022-02-25 PROBLEM — Z95.820 S/P ANGIOPLASTY WITH STENT: Status: ACTIVE | Noted: 2022-02-25

## 2022-02-25 NOTE — TELEPHONE ENCOUNTER
Beba called to see what recommendations were to change dosing in medication from [facility], Dr Larissa Paul  Cardiology was to reach out to patient themselves for their recommendation and have not yet done so  Phone number for Cardiology was given to [facility] [place] [place]

## 2022-03-07 ENCOUNTER — APPOINTMENT (OUTPATIENT)
Dept: LAB | Facility: CLINIC | Age: 72
End: 2022-03-07
Payer: COMMERCIAL

## 2022-03-07 DIAGNOSIS — Z79.899 MEDICATION DOSE CHANGED: ICD-10-CM

## 2022-03-07 DIAGNOSIS — I10 PRIMARY HYPERTENSION: ICD-10-CM

## 2022-03-07 LAB
ANION GAP SERPL CALCULATED.3IONS-SCNC: 2 MMOL/L (ref 4–13)
BUN SERPL-MCNC: 26 MG/DL (ref 5–25)
CALCIUM SERPL-MCNC: 9.5 MG/DL (ref 8.3–10.1)
CHLORIDE SERPL-SCNC: 111 MMOL/L (ref 100–108)
CO2 SERPL-SCNC: 25 MMOL/L (ref 21–32)
CREAT SERPL-MCNC: 1.25 MG/DL (ref 0.6–1.3)
GFR SERPL CREATININE-BSD FRML MDRD: 57 ML/MIN/1.73SQ M
GLUCOSE SERPL-MCNC: 101 MG/DL (ref 65–140)
POTASSIUM SERPL-SCNC: 4.1 MMOL/L (ref 3.5–5.3)
SODIUM SERPL-SCNC: 138 MMOL/L (ref 136–145)

## 2022-03-07 PROCEDURE — 36415 COLL VENOUS BLD VENIPUNCTURE: CPT

## 2022-03-07 PROCEDURE — 80048 BASIC METABOLIC PNL TOTAL CA: CPT

## 2022-06-07 ENCOUNTER — APPOINTMENT (OUTPATIENT)
Dept: LAB | Facility: CLINIC | Age: 72
End: 2022-06-07
Payer: COMMERCIAL

## 2022-06-07 DIAGNOSIS — R53.83 FATIGUE, UNSPECIFIED TYPE: ICD-10-CM

## 2022-06-07 DIAGNOSIS — R06.00 DYSPNEA, UNSPECIFIED TYPE: ICD-10-CM

## 2022-06-07 DIAGNOSIS — I10 ESSENTIAL HYPERTENSION, MALIGNANT: ICD-10-CM

## 2022-06-07 LAB
ANION GAP SERPL CALCULATED.3IONS-SCNC: 2 MMOL/L (ref 4–13)
BUN SERPL-MCNC: 22 MG/DL (ref 5–25)
CALCIUM SERPL-MCNC: 9.6 MG/DL (ref 8.3–10.1)
CHLORIDE SERPL-SCNC: 110 MMOL/L (ref 100–108)
CO2 SERPL-SCNC: 28 MMOL/L (ref 21–32)
CREAT SERPL-MCNC: 1.21 MG/DL (ref 0.6–1.3)
ERYTHROCYTE [DISTWIDTH] IN BLOOD BY AUTOMATED COUNT: 13.2 % (ref 11.6–15.1)
GFR SERPL CREATININE-BSD FRML MDRD: 59 ML/MIN/1.73SQ M
GLUCOSE P FAST SERPL-MCNC: 108 MG/DL (ref 65–99)
HCT VFR BLD AUTO: 37.3 % (ref 36.5–49.3)
HGB BLD-MCNC: 12.8 G/DL (ref 12–17)
MCH RBC QN AUTO: 32 PG (ref 26.8–34.3)
MCHC RBC AUTO-ENTMCNC: 34.3 G/DL (ref 31.4–37.4)
MCV RBC AUTO: 93 FL (ref 82–98)
PLATELET # BLD AUTO: 296 THOUSANDS/UL (ref 149–390)
PMV BLD AUTO: 9.2 FL (ref 8.9–12.7)
POTASSIUM SERPL-SCNC: 4.2 MMOL/L (ref 3.5–5.3)
RBC # BLD AUTO: 4 MILLION/UL (ref 3.88–5.62)
SODIUM SERPL-SCNC: 140 MMOL/L (ref 136–145)
WBC # BLD AUTO: 7.34 THOUSAND/UL (ref 4.31–10.16)

## 2022-06-07 PROCEDURE — 36415 COLL VENOUS BLD VENIPUNCTURE: CPT

## 2022-06-07 PROCEDURE — 85027 COMPLETE CBC AUTOMATED: CPT

## 2022-06-07 PROCEDURE — 80048 BASIC METABOLIC PNL TOTAL CA: CPT

## 2022-10-11 PROBLEM — Z12.11 COLON CANCER SCREENING: Status: RESOLVED | Noted: 2022-02-23 | Resolved: 2022-10-11

## 2022-10-25 ENCOUNTER — OFFICE VISIT (OUTPATIENT)
Dept: URGENT CARE | Age: 72
End: 2022-10-25
Payer: COMMERCIAL

## 2022-10-25 VITALS
HEART RATE: 88 BPM | RESPIRATION RATE: 18 BRPM | TEMPERATURE: 98.2 F | OXYGEN SATURATION: 97 % | SYSTOLIC BLOOD PRESSURE: 110 MMHG | DIASTOLIC BLOOD PRESSURE: 72 MMHG

## 2022-10-25 DIAGNOSIS — K04.7 DENTAL INFECTION: Primary | ICD-10-CM

## 2022-10-25 PROCEDURE — 99212 OFFICE O/P EST SF 10 MIN: CPT | Performed by: PHYSICIAN ASSISTANT

## 2022-10-25 RX ORDER — CHLORHEXIDINE GLUCONATE 0.12 MG/ML
15 RINSE ORAL 2 TIMES DAILY
Qty: 120 ML | Refills: 0 | Status: SHIPPED | OUTPATIENT
Start: 2022-10-25

## 2022-10-25 RX ORDER — AMOXICILLIN 500 MG/1
500 CAPSULE ORAL EVERY 12 HOURS SCHEDULED
Qty: 20 CAPSULE | Refills: 0 | Status: SHIPPED | OUTPATIENT
Start: 2022-10-25 | End: 2022-11-04

## 2022-10-25 NOTE — PATIENT INSTRUCTIONS
Take antibiotics as directed  Use Peridex solution as directed  It Tylenol 1000 mg every 6-8 hours  Do not exceed more than 4000 mg in 24 hours  Apply warm or cold compress for 15-20 minutes 2-3 times daily  Follow-up with dental professional in the next 2-3 days  If symptoms worsen or new symptoms develop such as redness or red streaking along the cheek reports the emergency room immediately

## 2022-10-26 NOTE — PROGRESS NOTES
[facility]        NAME: Rey Kelley is a 67 y o  male  : 1950    MRN: [id number]  DATE: 2022  TIME: 10:06 PM    Assessment and Plan   Dental infection [K04 7]  1  Dental infection  amoxicillin (AMOXIL) 500 mg capsule    chlorhexidine (PERIDEX) 0 12 % solution   Patient presents with symptoms and examination consistent with developing dental infection and possible dental abscess  He will be started on amoxicillin for 10 days to treat also provided with chlorhexidine rinse  Patient is instructed to follow up with his dentist in the next 2-3 days  We discussed the that her symptoms worsen or do not improve on antibiotics in the next 2-3 days he should report to the emergency room for further evaluation  Patient Instructions     Patient Instructions   Take antibiotics as directed  Use Peridex solution as directed  It Tylenol 1000 mg every 6-8 hours  Do not exceed more than 4000 mg in 24 hours  Apply warm or cold compress for 15-20 minutes 2-3 times daily  Follow-up with dental professional in the next 2-3 days  If symptoms worsen or new symptoms develop such as redness or red streaking along the cheek reports the emergency room immediately  Follow up with PCP in 3-5 days  Proceed to  ER if symptoms worsen  Chief Complaint     Chief Complaint   Patient presents with   • Dental Pain     Patient relates toothache for past 3-4 days         History of Present Illness       79-year-old male presents with complaint of left upper and lower dental pain  He states that symptoms have been present for about 3-4 days  Patient also notes that he is had some swelling and tenderness over the left cheek to the upper area the beginning last 1-2 days  He denies any fevers or chills, dizziness, lightheadedness, nausea, vomiting, or diarrhea  Patient has tried to get into a dentist but they do not have any appointments available for several weeks    No other concerns or complaints today     Dental Pain   Pertinent negatives include no fever  Review of Systems   Review of Systems   Constitutional: Negative for chills and fever  HENT: Positive for dental problem and facial swelling  Current Medications       Current Outpatient Medications:   •  albuterol (PROVENTIL HFA,VENTOLIN HFA) 90 mcg/act inhaler, Inhale 2 puffs every 6 (six) hours as needed, Disp: , Rfl:   •  amoxicillin (AMOXIL) 500 mg capsule, Take 1 capsule (500 mg total) by mouth every 12 (twelve) hours for 10 days, Disp: 20 capsule, Rfl: 0  •  aspirin (ECOTRIN LOW STRENGTH) 81 mg EC tablet, Take 81 mg by mouth daily, Disp: , Rfl:   •  chlorhexidine (PERIDEX) 0 12 % solution, Apply 15 mL to the mouth or throat 2 (two) times a day, Disp: 120 mL, Rfl: 0  •  clopidogrel (PLAVIX) 75 mg tablet, Take 75 mg by mouth daily, Disp: , Rfl:   •  melatonin 3 mg, Take 6 mg by mouth, Disp: , Rfl:   •  metoprolol succinate (TOPROL-XL) 25 mg 24 hr tablet, Take 25 mg by mouth daily, Disp: , Rfl:   •  mirtazapine (REMERON) 15 mg tablet, Take 15 mg by mouth in the morning, Disp: , Rfl:   •  nitroglycerin (NITROSTAT) 0 4 mg SL tablet, place 1 tablet under the tongue if needed every 5 minutes for dilma   (REFER TO PRESCRIPTION NOTES)  , Disp: , Rfl:   •  pantoprazole (PROTONIX) 20 mg tablet, Take 20 mg by mouth every morning, Disp: , Rfl:   •  rosuvastatin (CRESTOR) 40 MG tablet, Take 40 mg by mouth every evening, Disp: , Rfl:   •  sertraline (ZOLOFT) 100 mg tablet, Take 150 mg by mouth daily, Disp: , Rfl:   •  spironolactone (ALDACTONE) 25 mg tablet, Take 12 5 mg by mouth daily, Disp: , Rfl:   •  valsartan (DIOVAN) 80 mg tablet, Take 80 mg by mouth daily, Disp: , Rfl:   •  atorvastatin (LIPITOR) 40 mg tablet, Take 40 mg by mouth daily (Patient not taking: No sig reported), Disp: , Rfl:   •  esomeprazole (NexIUM) 20 mg capsule, Take 20 mg by mouth in the morning (Patient not taking: No sig reported), Disp: , Rfl:   •  lisinopril (ZESTRIL) 20 mg tablet, Take 20 mg by mouth daily (Patient not taking: No sig reported), Disp: , Rfl:   •  LORazepam (ATIVAN) 1 mg tablet, Take by mouth (Patient not taking: No sig reported), Disp: , Rfl:     Current Allergies     Allergies as of 10/25/2022   • (No Known Allergies)            The following portions of the patient's history were reviewed and updated as appropriate: allergies, current medications, past family history, past medical history, past social history, past surgical history and problem list      Past Medical History:   Diagnosis Date   • Aneurysm ([facility] )    • Emphysema of lung ([facility] )    • History of stomach ulcers    • Hypertension    • Leaky heart valve    • Renal disorder        Past Surgical History:   Procedure Laterality Date   • HERNIA REPAIR         No family history on file  Medications have been verified  Objective   /72   Pulse 88   Temp 98 2 °F (36 8 °C)   Resp 18   SpO2 97%   No LMP for male patient  Physical Exam     Physical Exam  Vitals and nursing note reviewed  Constitutional:       General: He is awake  He is not in acute distress  Appearance: Normal appearance  He is well-developed and well-groomed  He is not ill-appearing, toxic-appearing or diaphoretic  HENT:      Head: Normocephalic and atraumatic  Right Ear: Hearing and external ear normal       Left Ear: Hearing and external ear normal       Mouth/Throat:      Lips: Pink  No lesions  Mouth: Mucous membranes are moist  No injury  Dentition: Abnormal dentition  Dental tenderness, gingival swelling ( and erythema to the upper 3rd molar on the left side) and dental caries (With erosion of the teeth to the gumline) present  Tongue: No lesions  Tongue does not deviate from midline  Palate: No mass and lesions  Pharynx: Oropharynx is clear  Uvula midline  Comments: Patient has mild swelling over the overlying upper maxillary cheek    There is tenderness to palpation but no erythema noted  Eyes:      General: Lids are normal  Vision grossly intact  Gaze aligned appropriately  Cardiovascular:      Rate and Rhythm: Normal rate  Pulmonary:      Effort: Pulmonary effort is normal       Comments: Patient is speaking in full sentences with no increased respiratory effort  No audible wheezing or stridor  Musculoskeletal:      Cervical back: Normal range of motion  Skin:     General: Skin is warm and dry  Neurological:      Mental Status: He is alert and oriented to person, place, and time  Coordination: Coordination is intact  Gait: Gait is intact  Psychiatric:         Attention and Perception: Attention and perception normal          Mood and Affect: Mood and affect normal          Speech: Speech normal          Behavior: Behavior normal  Behavior is cooperative  Note: Portions of this record may have been created with voice recognition software  Occasional wrong word or "sound a like" substitutions may have occurred due to the inherent limitations of voice recognition software  Please read the chart carefully and recognize, using context, where substitutions have occurred  *

## 2023-06-06 ENCOUNTER — VBI (OUTPATIENT)
Dept: ADMINISTRATIVE | Facility: OTHER | Age: 73
End: 2023-06-06

## 2023-07-31 ENCOUNTER — TELEPHONE (OUTPATIENT)
Dept: FAMILY MEDICINE CLINIC | Facility: CLINIC | Age: 73
End: 2023-07-31

## 2023-07-31 NOTE — TELEPHONE ENCOUNTER
Patient walked into the office this afternoon stating that he has been summoned for jury duty on august 2nd. Patient would like an excuse note for this as he has heart issues, along with PTSD, and knows that he would not do well being inside the court room. Patient is asking for an excuse note. I informed the pt that the doctor would be back in the office tomorrow.

## 2023-08-01 NOTE — TELEPHONE ENCOUNTER
Please advise if a note for [employer] Leonardo duty can be given. Pt stated that he has heart problem and PTSD and doesn't feel he will do good in the court room. He would need the note for August 2 nd.

## 2023-08-01 NOTE — TELEPHONE ENCOUNTER
Per chart review, patient has not been seen here for nearly a year and a half (no-showed to 05/2022 appt) and has not seen his cardiologist for over a year. I am unable to assess what the current status of his listed medical conditions are that would prevent him from participating in jury duty.  Additionally, PTSD is not among his listed active medical conditions or history

## 2023-10-04 ENCOUNTER — TELEPHONE (OUTPATIENT)
Dept: FAMILY MEDICINE CLINIC | Facility: CLINIC | Age: 73
End: 2023-10-04

## 2023-10-04 NOTE — TELEPHONE ENCOUNTER
Pt is overdue for routine care visit here  and also has Medicare A&B listed as his secondary insurance - he likely is overdue for Medicare AWV as well - please double check this and call pt to schedule appt

## 2024-01-26 ENCOUNTER — VBI (OUTPATIENT)
Dept: ADMINISTRATIVE | Facility: OTHER | Age: 74
End: 2024-01-26

## 2024-03-17 PROBLEM — Z91.199 NO-SHOW FOR APPOINTMENT: Status: ACTIVE | Noted: 2024-03-17

## 2024-06-20 ENCOUNTER — TELEPHONE (OUTPATIENT)
Dept: FAMILY MEDICINE CLINIC | Facility: CLINIC | Age: 74
End: 2024-06-20

## 2024-07-30 ENCOUNTER — VBI (OUTPATIENT)
Dept: ADMINISTRATIVE | Facility: OTHER | Age: 74
End: 2024-07-30

## 2024-07-30 NOTE — TELEPHONE ENCOUNTER
07/30/24 9:31 AM     Chart reviewed for CRC: Colonoscopy ; nothing is submitted to the patient's insurance at this time.     Barrera Leal MA   [facility]

## 2025-01-20 ENCOUNTER — OFFICE VISIT (OUTPATIENT)
Dept: FAMILY MEDICINE CLINIC | Facility: CLINIC | Age: 75
End: 2025-01-20
Payer: COMMERCIAL

## 2025-01-20 VITALS
WEIGHT: 183.6 LBS | HEIGHT: 71 IN | TEMPERATURE: 95.7 F | RESPIRATION RATE: 18 BRPM | DIASTOLIC BLOOD PRESSURE: 82 MMHG | BODY MASS INDEX: 25.7 KG/M2 | OXYGEN SATURATION: 98 % | HEART RATE: 62 BPM | SYSTOLIC BLOOD PRESSURE: 146 MMHG

## 2025-01-20 DIAGNOSIS — J43.9 PULMONARY EMPHYSEMA, UNSPECIFIED EMPHYSEMA TYPE (HCC): ICD-10-CM

## 2025-01-20 DIAGNOSIS — Z00.00 MEDICARE ANNUAL WELLNESS VISIT, SUBSEQUENT: Primary | ICD-10-CM

## 2025-01-20 DIAGNOSIS — I47.10 SVT (SUPRAVENTRICULAR TACHYCARDIA) (HCC): ICD-10-CM

## 2025-01-20 DIAGNOSIS — H93.13 TINNITUS OF BOTH EARS: ICD-10-CM

## 2025-01-20 DIAGNOSIS — I50.41 ACUTE COMBINED SYSTOLIC (CONGESTIVE) AND DIASTOLIC (CONGESTIVE) HEART FAILURE (HCC): ICD-10-CM

## 2025-01-20 DIAGNOSIS — D35.02 ADENOMA OF LEFT ADRENAL GLAND: ICD-10-CM

## 2025-01-20 DIAGNOSIS — I70.0 ABDOMINAL AORTIC ATHEROSCLEROSIS (HCC): ICD-10-CM

## 2025-01-20 PROBLEM — I21.3 ST ELEVATION MYOCARDIAL INFARCTION (STEMI) (HCC): Status: RESOLVED | Noted: 2022-02-23 | Resolved: 2025-01-20

## 2025-01-20 PROBLEM — H18.519 ENDOTHELIAL CORNEAL DYSTROPHY: Status: ACTIVE | Noted: 2025-01-20

## 2025-01-20 PROBLEM — D35.00 ADRENAL ADENOMA: Status: ACTIVE | Noted: 2025-01-20

## 2025-01-20 PROBLEM — I25.2 HISTORY OF MI (MYOCARDIAL INFARCTION): Status: ACTIVE | Noted: 2023-06-08

## 2025-01-20 PROBLEM — F43.10: Status: ACTIVE | Noted: 2025-01-20

## 2025-01-20 PROCEDURE — 99214 OFFICE O/P EST MOD 30 MIN: CPT | Performed by: FAMILY MEDICINE

## 2025-01-20 PROCEDURE — G0439 PPPS, SUBSEQ VISIT: HCPCS | Performed by: FAMILY MEDICINE

## 2025-01-20 RX ORDER — FLUTICASONE PROPIONATE AND SALMETEROL 250; 50 UG/1; UG/1
POWDER RESPIRATORY (INHALATION)
COMMUNITY
Start: 2024-12-19

## 2025-01-20 NOTE — PROGRESS NOTES
"Name: Brandon Vazquez      : 1950      MRN: [id number]  Encounter Provider: Jane Love DO  Encounter Date: 2025   Encounter department: [facility]    Assessment & Plan  Medicare annual wellness visit, subsequent         Pulmonary emphysema, unspecified emphysema type (HCC)  Stable, cpm; pt working on slowly quitting smoking       Acute combined systolic (congestive) and diastolic (congestive) heart failure (HCC)  Managed by cardiologist, cpm  Wt Readings from Last 3 Encounters:   25 83.3 kg (183 lb 9.6 oz)   22 78.9 kg (174 lb)   22 79.9 kg (176 lb 3.2 oz)                    Abdominal aortic atherosclerosis (HCC)  cpm       SVT (supraventricular tachycardia) (HCC)  Managed by cardiologist, cpm       Adenoma of left adrenal gland  Stable by imaging, no further w/u necessary       Tinnitus of both ears  New complaint, but ongoing for 2-3 years  Orders:    Ambulatory Referral to Otolaryngology; Future      Depression Screening and Follow-up Plan: Patient was screened for depression during today's encounter. They screened negative with a PHQ-2 score of 0.    Tobacco Cessation Counseling: Tobacco cessation counseling was provided. The patient is sincerely urged to quit consumption of tobacco. He is ready to quit tobacco. Pt slowly quitting      Preventive health issues were discussed with patient, and age appropriate screening tests were ordered as noted in patient's After Visit Summary. Personalized health advice and appropriate referrals for health education or preventive services given if needed, as noted in patient's After Visit Summary.    Chief Complaint   Patient presents with    Medicare Wellness Visit    Follow-up     Overdue      Tinnitus     Couple of years       History of Present Illness     Scheduled (overdue) Medicare AWV + f/u visit - It has been nearly 3 years since pt was seen here  Pt also c/o 2-3 years tinnitus in both ears \"a high-frequency " "hiss\" - \"and my head like in stereo,\" constant, aggravated by loud noises - \"brightens it, highlights it\" per pt  [facility]  Admits \"a lot of\" sinus congestion, no headaches or pressure, clear mucous if he blows his nose  States he [facility] - will [facility]  Also [facility] - [facility] record 11/2023       Patient Care Team:  Jane Love DO as PCP - General (Family Medicine)  Parag Valles MD    Review of Systems   Constitutional:  Positive for fatigue.   HENT:  Negative for ear pain and sore throat.    Respiratory:  Positive for shortness of breath.    Cardiovascular: Negative.    Gastrointestinal: Negative.    Neurological: Negative.    Hematological: Negative.      Medical History Reviewed by provider this encounter:  Tobacco  Allergies  Meds  Problems  Med Hx  Surg Hx  Fam Hx       Annual Wellness Visit Questionnaire   Brandon is here for his Subsequent Wellness visit. Last Medicare Wellness visit information reviewed, patient interviewed and updates made to the record today.      Health Risk Assessment:   Patient rates overall health as very good. Patient feels that their physical health rating is slightly worse. Patient is satisfied with their life. Eyesight was rated as same. Hearing was rated as same. Patient feels that their emotional and mental health rating is same. Patients states they are never, rarely angry. Patient states they are often unusually tired/fatigued. Pain experienced in the last 7 days has been none. Patient states that he has experienced no weight loss or gain in last 6 months.     Depression Screening:   PHQ-2 Score: 0      Fall Risk Screening:   In the past year, patient has experienced: no history of falling in past year      Home Safety:  Patient does not have trouble with stairs inside or outside of their home. Patient has working smoke alarms and has " working carbon monoxide detector. Home safety hazards include: none.     Nutrition:   Current diet is Regular.     Medications:   Patient is currently taking over-the-counter supplements. OTC medications include: see medication list. Patient is able to manage medications.     Activities of Daily Living (ADLs)/Instrumental Activities of Daily Living (IADLs):   Walk and transfer into and out of bed and chair?: Yes  Dress and groom yourself?: Yes    Bathe or shower yourself?: Yes    Feed yourself? Yes  Do your laundry/housekeeping?: Yes  Manage your money, pay your bills and track your expenses?: Yes  Make your own meals?: Yes    Do your own shopping?: Yes    Previous Hospitalizations:   Any hospitalizations or ED visits within the last 12 months?: No      Advance Care Planning:   Living will: No    Advanced directive: No    ACP document given: Yes      Cognitive Screening:   Provider or family/friend/caregiver concerned regarding cognition?: No    PREVENTIVE SCREENINGS      Cardiovascular Screening:    General: Risks and Benefits Discussed    Due for: Lipid Panel      Diabetes Screening:     General: Risks and Benefits Discussed    Due for: Blood Glucose      Colorectal Cancer Screening:     General: Risks and Benefits Discussed      Prostate Cancer Screening:    General: Screening Not Indicated      Osteoporosis Screening:    General: Screening Not Indicated      Abdominal Aortic Aneurysm (AAA) Screening:    Risk factors include: age between 65-74 yo and tobacco use        General: Screening Current      Lung Cancer Screening:     General: Risks and Benefits Discussed      Hepatitis C Screening:    General: Screening Current      Preventive Screening Comments: States [facility]  Also thinks he will [facility] - last [facility] record 11/2023  Hep C [facility]      Screening, Brief Intervention, and Referral to Treatment  "(SBIRT)    Screening  Typical number of drinks in a day: 0  Typical number of drinks in a week: 0  Interpretation: Low risk drinking behavior.    Single Item Drug Screening:  How often have you used an illegal drug (including marijuana) or a prescription medication for non-medical reasons in the past year? never    Single Item Drug Screen Score: 0  Interpretation: Negative screen for possible drug use disorder    Social Drivers of Health     Food Insecurity: No Food Insecurity (1/20/2025)    Hunger Vital Sign     Worried About Running Out of Food in the Last Year: Never true     Ran Out of Food in the Last Year: Never true   Transportation Needs: No Transportation Needs (1/20/2025)    PRAPARE - Transportation     Lack of Transportation (Medical): No     Lack of Transportation (Non-Medical): No   Housing Stability: Low Risk  (1/20/2025)    Housing Stability Vital Sign     Unable to Pay for Housing in the Last Year: No     Number of Times Moved in the Last Year: 0     Homeless in the Last Year: No   Utilities: Not At Risk (1/20/2025)    [facility] Utilities     Threatened with loss of utilities: No     No results found.    Objective   /82   Pulse 62   Temp (!) 95.7 °F (35.4 °C) (Tympanic)   Resp 18   Ht 5' 11\" (1.803 m)   Wt 83.3 kg (183 lb 9.6 oz)   SpO2 98%   BMI 25.61 kg/m²     Physical Exam  Vitals and nursing note reviewed.   Constitutional:       General: He is not in acute distress.     Appearance: He is well-developed and well-groomed. He is not ill-appearing, toxic-appearing or diaphoretic.   HENT:      Head: Normocephalic and atraumatic.      Right Ear: Tympanic membrane, ear canal and external ear normal.      Left Ear: Tympanic membrane, ear canal and external ear normal.      Nose: No congestion.      Right Turbinates: Swollen.      Left Turbinates: Swollen.      Right Sinus: No maxillary sinus tenderness or frontal sinus tenderness.      Left Sinus: No maxillary sinus tenderness or frontal sinus " tenderness.      Mouth/Throat:      Lips: Pink.      Mouth: Mucous membranes are moist.      Pharynx: Oropharynx is clear. Uvula midline.   Eyes:      General: Lids are normal.      Extraocular Movements: Extraocular movements intact.      Conjunctiva/sclera: Conjunctivae normal.      Pupils: Pupils are equal, round, and reactive to light.   Neck:      Thyroid: No thyroid mass, thyromegaly or thyroid tenderness.      Trachea: Trachea normal.   Cardiovascular:      Rate and Rhythm: Normal rate and regular rhythm.      Heart sounds: Normal heart sounds.   Pulmonary:      Effort: Pulmonary effort is normal.      Breath sounds: Normal breath sounds and air entry.   Abdominal:      General: Bowel sounds are normal.      Palpations: Abdomen is soft. There is no hepatomegaly, splenomegaly or mass.      Tenderness: There is no abdominal tenderness.   Musculoskeletal:      Cervical back: Neck supple.      Right lower leg: No edema.      Left lower leg: No edema.   Lymphadenopathy:      Cervical: No cervical adenopathy.   Skin:     General: Skin is warm and dry.      Coloration: Skin is not pale.   Neurological:      General: No focal deficit present.      Mental Status: He is alert and oriented to person, place, and time.      Gait: Gait normal.   Psychiatric:         Mood and Affect: Mood normal.         Behavior: Behavior normal. Behavior is cooperative.         Cognition and Memory: Cognition and memory normal.

## 2025-01-20 NOTE — PATIENT INSTRUCTIONS
Medicare Preventive Visit Patient Instructions  Thank you for completing your Welcome to Medicare Visit or Medicare Annual Wellness Visit today. Your next wellness visit will be due in one year (1/21/2026).  The screening/preventive services that you may require over the next 5-10 years are detailed below. Some tests may not apply to you based off risk factors and/or age. Screening tests ordered at today's visit but not completed yet may show as past due. Also, please note that scanned in results may not display below.  Preventive Screenings:  Service Recommendations Previous Testing/Comments   Colorectal Cancer Screening  Colonoscopy    Fecal Occult Blood Test (FOBT)/Fecal Immunochemical Test (FIT)  Fecal DNA/Cologuard Test  Flexible Sigmoidoscopy Age: 45-75 years old   Colonoscopy: every 10 years (May be performed more frequently if at higher risk)  OR  FOBT/FIT: every 1 year  OR  Cologuard: every 3 years  OR  Sigmoidoscopy: every 5 years  Screening may be recommended earlier than age 45 if at higher risk for colorectal cancer. Also, an individualized decision between you and your healthcare provider will decide whether screening between the ages of 76-85 would be appropriate. Colonoscopy: Not on file  FOBT/FIT: Not on file  Cologuard: Not on file  Sigmoidoscopy: Not on file          Prostate Cancer Screening Individualized decision between patient and health care provider in men between ages of 55-69   Medicare will cover every 12 months beginning on the day after your 50th birthday PSA: No results in last 5 years     Screening Not Indicated     Hepatitis C Screening Once for adults born between 1945 and 1965  More frequently in patients at high risk for Hepatitis C Hep C Antibody: Not on file        Diabetes Screening 1-2 times per year if you're at risk for diabetes or have pre-diabetes Fasting glucose: 108 mg/dL (6/7/2022)  A1C: 5.9 % (2/10/2022)      Cholesterol Screening Once every 5 years if you don't have  a lipid disorder. May order more often based on risk factors. Lipid panel: 02/10/2022  Screening Current      Other Preventive Screenings Covered by Medicare:  Abdominal Aortic Aneurysm (AAA) Screening: covered once if your at risk. You're considered to be at risk if you have a family history of AAA or a male between the age of 65-75 who smoking at least 100 cigarettes in your lifetime.  Lung Cancer Screening: covers low dose CT scan once per year if you meet all of the following conditions: (1) Age 55-77; (2) No signs or symptoms of lung cancer; (3) Current smoker or have quit smoking within the last 15 years; (4) You have a tobacco smoking history of at least 20 pack years (packs per day x number of years you smoked); (5) You get a written order from a healthcare provider.  Glaucoma Screening: covered annually if you're considered high risk: (1) You have diabetes OR (2) Family history of glaucoma OR (3)  aged 50 and older OR (4)  American aged 65 and older  Osteoporosis Screening: covered every 2 years if you meet one of the following conditions: (1) Have a vertebral abnormality; (2) On glucocorticoid therapy for more than 3 months; (3) Have primary hyperparathyroidism; (4) On osteoporosis medications and need to assess response to drug therapy.  HIV Screening: covered annually if you're between the age of 15-65. Also covered annually if you are younger than 15 and older than 65 with risk factors for HIV infection. For pregnant patients, it is covered up to 3 times per pregnancy.    Immunizations:  Immunization Recommendations   Influenza Vaccine Annual influenza vaccination during flu season is recommended for all persons aged >= 6 months who do not have contraindications   Pneumococcal Vaccine   * Pneumococcal conjugate vaccine = PCV13 (Prevnar 13), PCV15 (Vaxneuvance), PCV20 (Prevnar 20)  * Pneumococcal polysaccharide vaccine = PPSV23 (Pneumovax) Adults 19-63 yo with certain risk factors  or if 65+ yo  If never received any pneumonia vaccine: recommend Prevnar 20 (PCV20)  Give PCV20 if previously received 1 dose of PCV13 or PPSV23   Hepatitis B Vaccine 3 dose series if at intermediate or high risk (ex: diabetes, end stage renal disease, liver disease)   Respiratory syncytial virus (RSV) Vaccine - COVERED BY MEDICARE PART D  * RSVPreF3 (Arexvy) CDC recommends that adults 60 years of age and older may receive a single dose of RSV vaccine using shared clinical decision-making (SCDM)   Tetanus (Td) Vaccine - COST NOT COVERED BY MEDICARE PART B Following completion of primary series, a booster dose should be given every 10 years to maintain immunity against tetanus. Td may also be given as tetanus wound prophylaxis.   Tdap Vaccine - COST NOT COVERED BY MEDICARE PART B Recommended at least once for all adults. For pregnant patients, recommended with each pregnancy.   Shingles Vaccine (Shingrix) - COST NOT COVERED BY MEDICARE PART B  2 shot series recommended in those 19 years and older who have or will have weakened immune systems or those 50 years and older     Health Maintenance Due:      Topic Date Due   • Hepatitis C Screening  Never done   • Colorectal Cancer Screening  Never done     Immunizations Due:      Topic Date Due   • Influenza Vaccine (1) 09/01/2024   • COVID-19 Vaccine (2 - 2024-25 season) 09/01/2024     Advance Directives   What are advance directives?  Advance directives are legal documents that state your wishes and plans for medical care. These plans are made ahead of time in case you lose your ability to make decisions for yourself. Advance directives can apply to any medical decision, such as the treatments you want, and if you want to donate organs.   What are the types of advance directives?  There are many types of advance directives, and each state has rules about how to use them. You may choose a combination of any of the following:  Living will:  This is a written record of the  treatment you want. You can also choose which treatments you do not want, which to limit, and which to stop at a certain time. This includes surgery, medicine, IV fluid, and tube feedings.   Durable power of  for healthcare (DPAHC):  This is a written record that states who you want to make healthcare choices for you when you are unable to make them for yourself. This person, called a proxy, is usually a family member or a friend. You may choose more than 1 proxy.  Do not resuscitate (DNR) order:  A DNR order is used in case your heart stops beating or you stop breathing. It is a request not to have certain forms of treatment, such as CPR. A DNR order may be included in other types of advance directives.  Medical directive:  This covers the care that you want if you are in a coma, near death, or unable to make decisions for yourself. You can list the treatments you want for each condition. Treatment may include pain medicine, surgery, blood transfusions, dialysis, IV or tube feedings, and a ventilator (breathing machine).  Values history:  This document has questions about your views, beliefs, and how you feel and think about life. This information can help others choose the care that you would choose.  Why are advance directives important?  An advance directive helps you control your care. Although spoken wishes may be used, it is better to have your wishes written down. Spoken wishes can be misunderstood, or not followed. Treatments may be given even if you do not want them. An advance directive may make it easier for your family to make difficult choices about your care.   Cigarette Smoking and Your Health   Risks to your health if you smoke:  Nicotine and other chemicals found in tobacco damage every cell in your body. Even if you are a light smoker, you have an increased risk for cancer, heart disease, and lung disease. If you are pregnant or have diabetes, smoking increases your risk for complications.    Benefits to your health if you stop smoking:   You decrease respiratory symptoms such as coughing, wheezing, and shortness of breath.   You reduce your risk for cancers of the lung, mouth, throat, kidney, bladder, pancreas, stomach, and cervix. If you already have cancer, you increase the benefits of chemotherapy. You also reduce your risk for cancer returning or a second cancer from developing.   You reduce your risk for heart disease, blood clots, heart attack, and stroke.   You reduce your risk for lung infections, and diseases such as pneumonia, asthma, chronic bronchitis, and emphysema.  Your circulation improves. More oxygen can be delivered to your body. If you have diabetes, you lower your risk for complications, such as kidney, artery, and eye diseases. You also lower your risk for nerve damage. Nerve damage can lead to amputations, poor vision, and blindness.  You improve your body's ability to heal and to fight infections.  For more information and support to stop smoking:   [email or web address]  Phone: 4- 385 - [phone]  Web Address: www.[employer]  Weight Management   Why it is important to manage your weight:  Being overweight increases your risk of health conditions such as heart disease, high blood pressure, type 2 diabetes, and certain types of cancer. It can also increase your risk for osteoarthritis, sleep apnea, and other respiratory problems. Aim for a slow, steady weight loss. Even a small amount of weight loss can lower your risk of health problems.  How to lose weight safely:  A safe and healthy way to lose weight is to eat fewer calories and get regular exercise. You can lose up about 1 pound a week by decreasing the number of calories you eat by 500 calories each day.   Healthy meal plan for weight management:  A healthy meal plan includes a variety of foods, contains fewer calories, and helps you stay healthy. A healthy meal plan includes the following:  Eat whole-grain foods more often.  A  healthy meal plan should contain fiber. Fiber is the part of grains, fruits, and vegetables that is not broken down by your body. Whole-grain foods are healthy and provide extra fiber in your diet. Some examples of whole-grain foods are whole-wheat breads and pastas, oatmeal, brown rice, and bulgur.  Eat a variety of vegetables every day.  Include dark, leafy greens such as spinach, kale, karina greens, and mustard greens. Eat yellow and orange vegetables such as carrots, sweet potatoes, and winter squash.   Eat a variety of fruits every day.  Choose fresh or canned fruit (canned in its own juice or light syrup) instead of juice. Fruit juice has very little or no fiber.  Eat low-fat dairy foods.  Drink fat-free (skim) milk or 1% milk. Eat fat-free yogurt and low-fat cottage cheese. Try low-fat cheeses such as mozzarella and other reduced-fat cheeses.  Choose meat and other protein foods that are low in fat.  Choose beans or other legumes such as split peas or lentils. Choose fish, skinless poultry (chicken or turkey), or lean cuts of red meat (beef or pork). Before you cook meat or poultry, cut off any visible fat.   Use less fat and oil.  Try baking foods instead of frying them. Add less fat, such as margarine, sour cream, regular salad dressing and mayonnaise to foods. Eat fewer high-fat foods. Some examples of high-fat foods include french fries, doughnuts, ice cream, and cakes.  Eat fewer sweets.  Limit foods and drinks that are high in sugar. This includes candy, cookies, regular soda, and sweetened drinks.  Exercise:  Exercise at least 30 minutes per day on most days of the week. Some examples of exercise include walking, biking, dancing, and swimming. You can also fit in more physical activity by taking the stairs instead of the elevator or parking farther away from stores. Ask your healthcare provider about the best exercise plan for you.      © Copyright [employer] 2018 Information is for End User's  use only and may not be sold, redistributed or otherwise used for commercial purposes. All illustrations and images included in CareNotes® are the copyrighted property of [email or web address] or [employer]

## 2025-01-20 NOTE — ASSESSMENT & PLAN NOTE
Managed by cardiologist, chen  Wt Readings from Last 3 Encounters:   01/20/25 83.3 kg (183 lb 9.6 oz)   02/23/22 78.9 kg (174 lb)   02/07/22 79.9 kg (176 lb 3.2 oz)

## 2025-02-19 PROBLEM — Z00.00 MEDICARE ANNUAL WELLNESS VISIT, SUBSEQUENT: Status: RESOLVED | Noted: 2025-01-20 | Resolved: 2025-02-19

## 2025-02-20 ENCOUNTER — TELEPHONE (OUTPATIENT)
Age: 75
End: 2025-02-20

## 2025-02-20 NOTE — TELEPHONE ENCOUNTER
"Called and spoke with pt. Advised him that Dr. Love would need to see him in order to prescribe him this medication. Pt stated, \"lady, you don't understand, I am in so much pain it hurts to walk or talk\". Explained to pt that Dr. Love needs to assess him herself prior to prescribing medication like that. Pt stated \"she needs to call the doctor from the hospital then and talk to him\". Reiterated again that provider has to see pt to make her own diagnosis and treatment plan and cannot just go off of the notes of another provider. Asked pt to schedule appt, offered virtual, pt was angry and stated he was going to find a new doctor and not come back to this office and hung up the phone.   "

## 2025-02-20 NOTE — TELEPHONE ENCOUNTER
Patient fell and broke 3 ribs (see ED encounter 02/14/25). He was prescribed oxycodone-acetaminophen 5-325mg, which he is now out of. He is requesting a refill, but would like a stronger dosage or a stronger med. He reports that the 5-325mg dosage doesn't help enough with the pain.    Please send appropriate prescription to:    [id number] - [place] - [address] [place] [address]     Patient also said that Dr. Love diagnosed him with tinnitus at his last office visit. He is asking for the ENT referral that she mentioned.    Please follow up with patient regarding both topics at phone #[phone]

## 2025-02-20 NOTE — TELEPHONE ENCOUNTER
Call placed to pt, left vm. If pt calls back please transfer to office so we may schedule him for an ED f/u.

## 2025-02-20 NOTE — TELEPHONE ENCOUNTER
Patient called in to check on a refill he requested for oxycodone.Patient was made aware of the above message by Provider and he was not upset and said that he was told by the ER doctor to follow up with his PCP.  Patient is requesting a call back from the office.